# Patient Record
Sex: MALE | Race: BLACK OR AFRICAN AMERICAN | Employment: FULL TIME | URBAN - METROPOLITAN AREA
[De-identification: names, ages, dates, MRNs, and addresses within clinical notes are randomized per-mention and may not be internally consistent; named-entity substitution may affect disease eponyms.]

---

## 2022-08-18 ENCOUNTER — HOSPITAL ENCOUNTER (OUTPATIENT)
Dept: NUTRITION | Age: 47
Discharge: HOME | End: 2022-08-18
Payer: COMMERCIAL

## 2022-08-18 PROCEDURE — 97802 MEDICAL NUTRITION INDIV IN: CPT

## 2022-08-18 NOTE — PROGRESS NOTES
[address] [place] [address]  [address] [facility], [address] [facility]  Phone: [phone] Fax: [phone]   Nutrition Assessment - Medical Nutrition Therapy   Outpatient Initial Evaluation         Patient Name: Michelle Turner : 1975   Treatment Diagnosis: Prediabetes    Referral Source: Deann Scott MD [facility]): 2022     Gender: male Age: 41 y.o. Ht: 67 in Wt: 280  lb  kg   BMI: 43.9 BMR   Male 2114 BMR Female      Past Medical History:  High Cholesterol, High Blood Pressure, Prediabetes      Pertinent Medications:   Includes: Aspirin, Atorvastatin, Metoprolol, Brilinta      Biochemical Data:        Assessment:   Patient is present to learn about nutrition related to prediabetes and weight loss. Patient reports that he and his wife are working together to make lifestyle changes for weight loss. Patient is not following a specific diet plan but reports he is just trying to reduce sugar. Patient works the night shift and has a very inconsistent eating pattern. Patient does not currently have an exercise routine. Food & Nutrition: 24 Hour Recall:  Breakfast: toast, eggs  Lunch: [employer] fruit and pecan salad  Dinner: wings, pot stickers  Drinks: water, juice  Snacks: nuts, fruit, chips       Estimate Needs   Calories:  2200 Protein: 138 Carbs: 248 Fat: 73   Kcal/day  g/day  g/day  g/day        percent: 25  45  30               Nutrition Diagnosis Obesity related to excessive carbohydrate intake as evidenced by 24 hour recall of carbohydrate dense foods (pot stickers, juice, chips). Nutrition Intervention &  Education: Educated patient on the need for a consistent carbohydrate intake related to diabetic control and weight loss. Educated patient on nutrition label reading, emphasizing carbohydrates and serving size. Educated patient on protein sources, encouraged patient to incorporate mostly lean protein source with all carbohydrates.  Encouraged patient to exercise after meals when possible. Handouts Provided: [x]  Carbohydrates  [x]  Protein  [x]  Non-starchy Vegatbles  [x]  Food Label  [x]  Meal and Snack Ideas  []  Food Journals [x]  Diabetes  []  Cholesterol  []  Sodium  []  Gen Nutr Guidelines  []  SBGM Guidelines  []  Others:   Information Reviewed with: Patient    Readiness to Change Stage: []  Pre-contemplative    [x]  Contemplative  []  Preparation               []  Action                  []  Maintenance   Potential Barriers to Learning: []  Decline in memory    []  Language barrier   []  Other:  []  Emotional                  []  Limited mobility  [x]  Lack of motivation     [] Vision, hearing or cognitive impairment   Expected Compliance: Fair      Nutritional Goal - To promote lifestyle changes to result in:    [x]  Weight loss  [x]  Improved diabetic control  []  Decreased cholesterol levels  []  Decreased blood pressure  []  Weight maintenance []  Preventing any interactions associated with food allergies  []  Adequate weight gain toward goal weight  []  Other:        Patient Goals:   1. Patient will consume three meals per day 4-5 hours apart. 2. Patient will include a protein at all meals and snacks. 3. Patient will drink 64 ounces of water daily.        Dietitian Signature: Manuel Nichole RD Date: 8/18/2022   Follow-up:  Time: 10:28 AM